# Patient Record
(demographics unavailable — no encounter records)

---

## 2024-11-01 NOTE — HISTORY OF PRESENT ILLNESS
[de-identified] : 10/30/2024 - Patient presenting in follow up. He reports zero pain and complete resolution of numbness in his right arm. He completed 6 sessions of PT ending last Tuesday and is now completing HEP. No need to take medications for pain.   09/18/2024 - Patient returns to the office to review MRI. Continues to complain of right upper extremity weakness and numbness. Not experiencing significant right arm pain at this time. No left arm symptoms. No changes in bowel or bladder function. No changes in balance or dexterity.  09/04/2024: Patient presenting for initial evaluation of Neck pain radiating down RT side with numbness and weakness that has been going on for 3 weeks. He does not recall a specific NENA that happened to trigger this. States over the past 3 weeks has gotten worse. He went to his PCP and was prescribed a steroid which he has since finished and reports it did not help. Patient reports his PCP gave him a muscle relaxer to take at night.   The patient is a 51 year male who presents today complaining of neck pain radiating to right arm Date of Injury/Onset: 3 weeks ago Pain:    At Rest: 7/10  With Activity:  7/10  Mechanism of injury: NKI Quality of symptoms: numbness, tingling, weakness. achy, sharp at times Improves with: nothing Worse with: constant Prior treatment: prednisone taper Prior Imaging: no Additional Information: None

## 2024-11-01 NOTE — HISTORY OF PRESENT ILLNESS
[de-identified] : 10/30/2024 - Patient presenting in follow up. He reports zero pain and complete resolution of numbness in his right arm. He completed 6 sessions of PT ending last Tuesday and is now completing HEP. No need to take medications for pain.   09/18/2024 - Patient returns to the office to review MRI. Continues to complain of right upper extremity weakness and numbness. Not experiencing significant right arm pain at this time. No left arm symptoms. No changes in bowel or bladder function. No changes in balance or dexterity.  09/04/2024: Patient presenting for initial evaluation of Neck pain radiating down RT side with numbness and weakness that has been going on for 3 weeks. He does not recall a specific NENA that happened to trigger this. States over the past 3 weeks has gotten worse. He went to his PCP and was prescribed a steroid which he has since finished and reports it did not help. Patient reports his PCP gave him a muscle relaxer to take at night.   The patient is a 51 year male who presents today complaining of neck pain radiating to right arm Date of Injury/Onset: 3 weeks ago Pain:    At Rest: 7/10  With Activity:  7/10  Mechanism of injury: NKI Quality of symptoms: numbness, tingling, weakness. achy, sharp at times Improves with: nothing Worse with: constant Prior treatment: prednisone taper Prior Imaging: no Additional Information: None

## 2024-11-01 NOTE — PHYSICAL EXAM
[Normal Coordination] : normal coordination [Normal DTR UE/LE] : normal DTR UE/LE  [Normal Sensation] : normal sensation [Normal Mood and Affect] : normal mood and affect [Oriented] : oriented [Able to Communicate] : able to communicate [Normal Skin] : normal skin [No Rash] : no rash [No Ulcers] : no ulcers [No Lesions] : no lesions [No obvious lymphadenopathy in areas examined] : no obvious lymphadenopathy in areas examined [Well Developed] : well developed [Peripheral vascular exam is grossly normal] : peripheral vascular exam is grossly normal [No Respiratory Distress] : no respiratory distress [] : Numbness does not radiate to right arm with motion

## 2024-11-01 NOTE — DISCUSSION/SUMMARY
[de-identified] : The patient has transient nerve inflammation and is asymptomatic at this time. He has completed physical therapy and will continue with home exercises and stretching as tolerated. He is not symptomatic enough to require medication for pain. Follow up as needed.  Prior to appointment and during encounter with patient extensive medical records were reviewed including but not limited to, hospital records, outpatient records, imaging results, and lab data. During this appointment the patient was examined, diagnoses were discussed and explained in a face to face manner. In addition extensive time was spent reviewing aforementioned diagnostic studies. Counseling including abnormal image results, differential diagnoses, treatment options, risk and benefits, lifestyle changes, current condition, and current medications was performed. Patient's comments, questions, and concerns were addressed and patient verbalized understanding. Based on this patient's presentation at our office, which is an orthopedic spine surgeon's office, this patient inherently / intrinsically has a risk, however minute, of developing issues such as Cauda equina syndrome, bowel and bladder changes, or progression of motor or neurological deficits such as paralysis which may be permanent.  ILANA CARTWRIGHT Acting as a Scribe for Dr. Shade SEN, Ilana Cartwright, attest that this documentation has been prepared under the direction and in the presence of Provider Phu Laguerre MD.

## 2024-11-01 NOTE — DISCUSSION/SUMMARY
[de-identified] : The patient has transient nerve inflammation and is asymptomatic at this time. He has completed physical therapy and will continue with home exercises and stretching as tolerated. He is not symptomatic enough to require medication for pain. Follow up as needed.  Prior to appointment and during encounter with patient extensive medical records were reviewed including but not limited to, hospital records, outpatient records, imaging results, and lab data. During this appointment the patient was examined, diagnoses were discussed and explained in a face to face manner. In addition extensive time was spent reviewing aforementioned diagnostic studies. Counseling including abnormal image results, differential diagnoses, treatment options, risk and benefits, lifestyle changes, current condition, and current medications was performed. Patient's comments, questions, and concerns were addressed and patient verbalized understanding. Based on this patient's presentation at our office, which is an orthopedic spine surgeon's office, this patient inherently / intrinsically has a risk, however minute, of developing issues such as Cauda equina syndrome, bowel and bladder changes, or progression of motor or neurological deficits such as paralysis which may be permanent.  ILANA CARTWRIGHT Acting as a Scribe for Dr. Shade SEN, Ilana Cartwright, attest that this documentation has been prepared under the direction and in the presence of Provider Phu Laguerre MD.

## 2024-11-01 NOTE — DATA REVIEWED
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 C6/7 right sided HNP  I stop paperwork reviewed